# Patient Record
Sex: FEMALE | Race: WHITE
[De-identification: names, ages, dates, MRNs, and addresses within clinical notes are randomized per-mention and may not be internally consistent; named-entity substitution may affect disease eponyms.]

---

## 2021-01-27 ENCOUNTER — HOSPITAL ENCOUNTER
Dept: HOSPITAL 49 - FER | Age: 66
LOS: 1 days | Discharge: HOME | End: 2021-01-28
Admitting: INTERNAL MEDICINE
Payer: COMMERCIAL

## 2021-01-27 DIAGNOSIS — Z79.02: ICD-10-CM

## 2021-01-27 DIAGNOSIS — I25.10: ICD-10-CM

## 2021-01-27 DIAGNOSIS — E11.9: ICD-10-CM

## 2021-01-27 DIAGNOSIS — J44.1: ICD-10-CM

## 2021-01-27 DIAGNOSIS — R07.89: Primary | ICD-10-CM

## 2021-01-27 DIAGNOSIS — F17.210: ICD-10-CM

## 2021-01-27 DIAGNOSIS — I25.2: ICD-10-CM

## 2021-01-27 DIAGNOSIS — E89.0: ICD-10-CM

## 2021-01-27 DIAGNOSIS — I10: ICD-10-CM

## 2021-01-27 DIAGNOSIS — Z20.822: ICD-10-CM

## 2021-01-27 DIAGNOSIS — Z79.82: ICD-10-CM

## 2021-01-27 DIAGNOSIS — K21.9: ICD-10-CM

## 2021-01-27 DIAGNOSIS — R31.0: ICD-10-CM

## 2021-01-27 DIAGNOSIS — F41.8: ICD-10-CM

## 2021-01-27 DIAGNOSIS — E78.00: ICD-10-CM

## 2021-01-27 DIAGNOSIS — Z82.49: ICD-10-CM

## 2021-01-27 DIAGNOSIS — Z79.84: ICD-10-CM

## 2021-01-27 LAB
ALBUMIN SERPL-MCNC: 3.2 G/DL (ref 3.4–5)
ALKALINE PHOSHATASE: 112 U/L (ref 46–116)
ALT SERPL-CCNC: 36 U/L (ref 14–59)
AST: 24 U/L (ref 15–37)
BACTERIA: (no result)
BASOPHIL: 1.3 % (ref 0–2)
BILIRUBIN - TOTAL: 0.4 MG/DL (ref 0.2–1)
BILIRUBIN: NEGATIVE MG/DL
BLOOD: (no result) ERY/UL
BUN SERPL-MCNC: 10 MG/DL (ref 7–18)
BUN/CREAT RATIO (CALC): 16.9 RATIO
CHLORIDE: 106 MMOL/L (ref 98–107)
CLARITY UR: (no result)
CO2 (BICARBONATE): 26 MMOL/L (ref 21–32)
COLOR: (no result)
CREATININE: 0.59 MG/DL (ref 0.51–0.95)
EOSINOPHIL: 7 % (ref 0–7)
GLOBULIN (CALCULATION): 3.9 G/DL
GLUCOSE (U): NORMAL MG/DL
GLUCOSE SERPL-MCNC: 104 MG/DL (ref 74–106)
HCT: 39.5 % (ref 37–47)
HGB BLD-MCNC: 12.4 G/DL (ref 12.5–16)
LACTIC ACID: 1.4 MMOL/L (ref 0.4–1.9)
LDH1 SERPL-CCNC: 233 U/L (ref 81–234)
LEUKOCYTES: NEGATIVE LEU/UL
LYMPHOCYTE: 28.2 % (ref 15–48)
MCH RBC QN AUTO: 27.7 PG (ref 25–31)
MCHC RBC AUTO-ENTMCNC: 31.4 G/DL (ref 32–36)
MCV: 88.2 FL (ref 78–100)
MONOCYTE: 8.6 % (ref 0–12)
MPV: 10.8 FL (ref 6–9.5)
NEUTROPHIL: 54.6 % (ref 41–80)
NITRITE: NEGATIVE MG/DL
NRBC: 0
PLT: 220 K/UL (ref 150–400)
POTASSIUM: 3.8 MMOL/L (ref 3.5–5.1)
PRO-BNP: 392 PG/ML (ref ?–125)
PROTEIN: (no result) MG/DL
RBC MORPHOLOGY: NORMAL
RBC: 4.48 M/UL (ref 4.2–5.4)
RDW: 13.2 % (ref 11.5–14)
SPECIFIC GRAVITY: 1.01 (ref 1–1.03)
SQUAMOUS EPITHELIAL CELLS: (no result)
TOTAL PROTEIN: 7.1 G/DL (ref 6.4–8.2)
URINARY RBC: (no result)
UROBILINOGEN: 1 MG/DL (ref 0.2–1)
WBC: 7.9 K/UL (ref 4–10.5)

## 2021-01-27 PROCEDURE — G0378 HOSPITAL OBSERVATION PER HR: HCPCS

## 2021-01-27 PROCEDURE — U0002 COVID-19 LAB TEST NON-CDC: HCPCS

## 2021-01-28 LAB
BASOPHIL: 1.2 % (ref 0–2)
EOSINOPHIL: 6.6 % (ref 0–7)
HCT: 42.4 % (ref 37–47)
HGB BLD-MCNC: 13.1 G/DL (ref 12.5–16)
LYMPHOCYTE: 27.1 % (ref 15–48)
MCH RBC QN AUTO: 27.5 PG (ref 25–31)
MCHC RBC AUTO-ENTMCNC: 30.9 G/DL (ref 32–36)
MCV: 89.1 FL (ref 78–100)
MONOCYTE: 6.9 % (ref 0–12)
MPV: 11.3 FL (ref 6–9.5)
NEUTROPHIL: 58.1 % (ref 41–80)
NRBC: 0
PLT: 229 K/UL (ref 150–400)
RBC MORPHOLOGY: NORMAL
RBC: 4.76 M/UL (ref 4.2–5.4)
RDW: 13.4 % (ref 11.5–14)
WBC: 6.8 K/UL (ref 4–10.5)

## 2021-01-28 NOTE — NUR
1/28/21 Ms. Avila was  in Nov 2020. She is living with her daughter,
son-in-law and grandson. Ms. Avila has experienced a number of deaths; Mother
on [place], brother in Jan 2021, and an aunt last week. She is experiencing
grief. She denies suicidal ideations, intentions, or attempts. - MS. Avila was
provided with a crisis hotline, counseling services, and bereavement support
group through [facility]. ---She has a cane.

## 2021-07-08 ENCOUNTER — HOSPITAL ENCOUNTER (EMERGENCY)
Dept: HOSPITAL 49 - FER | Age: 66
LOS: 1 days | Discharge: HOME | End: 2021-07-09
Payer: MEDICARE

## 2021-07-08 DIAGNOSIS — E03.9: Primary | ICD-10-CM
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
## 2021-07-08 DIAGNOSIS — J44.9: ICD-10-CM

## 2021-07-08 DIAGNOSIS — S20.461A: ICD-10-CM

## 2021-07-08 DIAGNOSIS — F17.210: ICD-10-CM

## 2021-07-08 LAB
ALBUMIN SERPL-MCNC: 3.3 G/DL (ref 3.4–5)
ALKALINE PHOSHATASE: 92 U/L (ref 46–116)
ALT SERPL-CCNC: 31 U/L (ref 14–59)
AST: 22 U/L (ref 15–37)
BASOPHIL: 1.1 % (ref 0–2)
BILIRUBIN - TOTAL: 0.4 MG/DL (ref 0.2–1)
BUN SERPL-MCNC: 23 MG/DL (ref 7–18)
BUN/CREAT RATIO (CALC): 25.3 RATIO
CHLORIDE: 104 MMOL/L (ref 98–107)
CO2 (BICARBONATE): 30 MMOL/L (ref 21–32)
CREATININE: 0.91 MG/DL (ref 0.51–0.95)
EOSINOPHIL: 4.5 % (ref 0–7)
GLOBULIN (CALCULATION): 3.9 G/DL
GLUCOSE SERPL-MCNC: 152 MG/DL (ref 74–106)
HCT: 38.8 % (ref 37–47)
HGB BLD-MCNC: 12.4 G/DL (ref 12.5–16)
LYMPHOCYTE: 26.5 % (ref 15–48)
MCH RBC QN AUTO: 28.4 PG (ref 25–31)
MCHC RBC AUTO-ENTMCNC: 32 G/DL (ref 32–36)
MCV: 89 FL (ref 78–100)
MONOCYTE: 8.7 % (ref 0–12)
MPV: 10.7 FL (ref 6–9.5)
NEUTROPHIL: 58.8 % (ref 41–80)
NRBC: 0
PLT: 212 K/UL (ref 150–400)
POTASSIUM: 4 MMOL/L (ref 3.5–5.1)
RBC MORPHOLOGY: NORMAL
RBC: 4.36 M/UL (ref 4.2–5.4)
RDW: 14.1 % (ref 11.5–14)
TOTAL PROTEIN: 7.2 G/DL (ref 6.4–8.2)
WBC: 9.3 K/UL (ref 4–10.5)

## 2021-07-09 LAB
AMPHETAMINES: NEGATIVE
BACTERIA: (no result)
BARBITURATES: NEGATIVE
BILIRUBIN: NEGATIVE MG/DL
BLOOD: NEGATIVE ERY/UL
CLARITY UR: CLEAR
COLOR: YELLOW
ECSTASY (MDMA): NEGATIVE
GLUCOSE (U): NORMAL MG/DL
LEUKOCYTES: NEGATIVE LEU/UL
MARIJUANA (THC): NEGATIVE
METHADONE: NEGATIVE
NITRITE: NEGATIVE MG/DL
OPIATES: NEGATIVE
OXYCODONE: NEGATIVE
PROTEIN: (no result) MG/DL
SPECIFIC GRAVITY: 1.02 (ref 1–1.03)
SQUAMOUS EPITHELIAL CELLS: (no result)
URINARY RBC: (no result)
URINARY WBC: (no result)
UROBILINOGEN: 4 MG/DL (ref 0.2–1)

## 2022-05-09 ENCOUNTER — HOSPITAL ENCOUNTER (INPATIENT)
Dept: HOSPITAL 49 - FER | Age: 67
LOS: 7 days | Discharge: HOME | DRG: 871 | End: 2022-05-16
Attending: INTERNAL MEDICINE | Admitting: INTERNAL MEDICINE
Payer: COMMERCIAL

## 2022-05-09 VITALS — BODY MASS INDEX: 35.76 KG/M2 | HEIGHT: 64.02 IN | WEIGHT: 209.44 LBS

## 2022-05-09 DIAGNOSIS — E87.6: ICD-10-CM

## 2022-05-09 DIAGNOSIS — E78.5: ICD-10-CM

## 2022-05-09 DIAGNOSIS — E89.0: ICD-10-CM

## 2022-05-09 DIAGNOSIS — K21.9: ICD-10-CM

## 2022-05-09 DIAGNOSIS — Z79.02: ICD-10-CM

## 2022-05-09 DIAGNOSIS — M25.561: ICD-10-CM

## 2022-05-09 DIAGNOSIS — F32.A: ICD-10-CM

## 2022-05-09 DIAGNOSIS — M25.562: ICD-10-CM

## 2022-05-09 DIAGNOSIS — Z20.822: ICD-10-CM

## 2022-05-09 DIAGNOSIS — Z90.710: ICD-10-CM

## 2022-05-09 DIAGNOSIS — J44.0: ICD-10-CM

## 2022-05-09 DIAGNOSIS — D52.9: ICD-10-CM

## 2022-05-09 DIAGNOSIS — F17.210: ICD-10-CM

## 2022-05-09 DIAGNOSIS — Z98.890: ICD-10-CM

## 2022-05-09 DIAGNOSIS — N39.0: ICD-10-CM

## 2022-05-09 DIAGNOSIS — B37.7: ICD-10-CM

## 2022-05-09 DIAGNOSIS — R09.02: ICD-10-CM
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
## 2022-05-09 DIAGNOSIS — F41.9: ICD-10-CM

## 2022-05-09 DIAGNOSIS — Z28.310: ICD-10-CM

## 2022-05-09 DIAGNOSIS — J44.1: ICD-10-CM

## 2022-05-09 DIAGNOSIS — E83.42: ICD-10-CM

## 2022-05-09 DIAGNOSIS — A41.59: Primary | ICD-10-CM

## 2022-05-09 DIAGNOSIS — E11.9: ICD-10-CM

## 2022-05-09 DIAGNOSIS — Z86.14: ICD-10-CM

## 2022-05-09 DIAGNOSIS — N17.0: ICD-10-CM

## 2022-05-09 DIAGNOSIS — R65.21: ICD-10-CM

## 2022-05-09 DIAGNOSIS — M54.50: ICD-10-CM

## 2022-05-09 DIAGNOSIS — I11.0: ICD-10-CM

## 2022-05-09 DIAGNOSIS — G89.29: ICD-10-CM

## 2022-05-09 DIAGNOSIS — J98.11: ICD-10-CM

## 2022-05-09 DIAGNOSIS — Z79.899: ICD-10-CM

## 2022-05-09 DIAGNOSIS — Z79.84: ICD-10-CM

## 2022-05-09 DIAGNOSIS — A41.81: ICD-10-CM

## 2022-05-09 DIAGNOSIS — D50.9: ICD-10-CM

## 2022-05-09 DIAGNOSIS — G93.41: ICD-10-CM

## 2022-05-09 DIAGNOSIS — J18.9: ICD-10-CM

## 2022-05-09 DIAGNOSIS — Z95.5: ICD-10-CM

## 2022-05-09 DIAGNOSIS — I50.9: ICD-10-CM

## 2022-05-09 LAB
ALBUMIN SERPL-MCNC: 3.2 G/DL (ref 3.4–5)
ALKALINE PHOSHATASE: 143 U/L (ref 46–116)
ALT SERPL-CCNC: 32 U/L (ref 14–59)
AST: 29 U/L (ref 15–37)
BACTERIA: (no result)
BACTERIA: (no result)
BASOPHIL: 0.3 % (ref 0–2)
BASOPHIL: 0.4 % (ref 0–2)
BASOPHIL: 0.4 % (ref 0–2)
BILIRUBIN - TOTAL: 1.1 MG/DL (ref 0.2–1)
BILIRUBIN: NEGATIVE MG/DL
BILIRUBIN: NEGATIVE MG/DL
BLOOD: (no result) ERY/UL
BLOOD: (no result) ERY/UL
BUN SERPL-MCNC: 17 MG/DL (ref 7–18)
BUN SERPL-MCNC: 18 MG/DL (ref 7–18)
BUN SERPL-MCNC: 22 MG/DL (ref 7–18)
BUN SERPL-MCNC: 26 MG/DL (ref 7–18)
BUN/CREAT RATIO (CALC): 10.6 RATIO
BUN/CREAT RATIO (CALC): 11.7 RATIO
BUN/CREAT RATIO (CALC): 14.3 RATIO
BUN/CREAT RATIO (CALC): 15.6 RATIO
C-REACTIVE PROTEIN: 11.1 MG/DL (ref ?–0.9)
CHLORIDE: 102 MMOL/L (ref 98–107)
CHLORIDE: 102 MMOL/L (ref 98–107)
CHLORIDE: 106 MMOL/L (ref 98–107)
CHLORIDE: 106 MMOL/L (ref 98–107)
CLARITY UR: (no result)
CLARITY UR: CLEAR
CO2 (BICARBONATE): 18 MMOL/L (ref 21–32)
CO2 (BICARBONATE): 20 MMOL/L (ref 21–32)
CO2 (BICARBONATE): 20 MMOL/L (ref 21–32)
CO2 (BICARBONATE): 26 MMOL/L (ref 21–32)
COLOR: YELLOW
COLOR: YELLOW
CORONAVIRUS 2019 SARS-COV-2: NEGATIVE
CREATININE: 1.19 MG/DL (ref 0.51–0.95)
CREATININE: 1.67 MG/DL (ref 0.51–0.95)
CREATININE: 1.7 MG/DL (ref 0.51–0.95)
CREATININE: 1.88 MG/DL (ref 0.51–0.95)
EOSINOPHIL: 0 % (ref 0–7)
EOSINOPHIL: 0.2 % (ref 0–7)
EOSINOPHIL: 0.2 % (ref 0–7)
FOLIC ACID (SERUM): 4.9 NG/ML (ref 8.6–58.9)
FT4 (FREE T4): 1.3 NG/DL (ref 0.76–1.46)
GLOBULIN (CALCULATION): 3.8 G/DL
GLUCOSE (U): NORMAL MG/DL
GLUCOSE (U): NORMAL MG/DL
GLUCOSE SERPL-MCNC: 136 MG/DL (ref 74–106)
GLUCOSE SERPL-MCNC: 165 MG/DL (ref 74–106)
GLUCOSE SERPL-MCNC: 184 MG/DL (ref 74–106)
GLUCOSE SERPL-MCNC: 240 MG/DL (ref 74–106)
HCT: 36.8 % (ref 37–47)
HCT: 38.9 % (ref 37–47)
HCT: 40.7 % (ref 37–47)
HGB BLD-MCNC: 11.5 G/DL (ref 12.5–16)
HGB BLD-MCNC: 12.2 G/DL (ref 12.5–16)
HGB BLD-MCNC: 12.5 G/DL (ref 12.5–16)
INFLUENZA A NAA: NEGATIVE
INR PPP: 1.11 (ref 0.9–1.2)
IRON % SATURATION: 3.4 %SAT (ref 20–50)
IRON SERPL-MCNC: 11 UG/DL (ref 50–170)
LACTIC ACID: 2.4 MMOL/L (ref 0.4–1.9)
LEUKOCYTES: (no result) LEU/UL
LEUKOCYTES: (no result) LEU/UL
LIPASE: 63 U/L (ref 73–393)
LYMPHOCYTE: 3.2 % (ref 15–48)
LYMPHOCYTE: 4.4 % (ref 15–48)
LYMPHOCYTE: 5.9 % (ref 15–48)
MAGNESIUM SERPL-MCNC: 2.1 MG/DL (ref 1.8–2.4)
MAGNESIUM SERPL-MCNC: 2.4 MG/DL (ref 1.8–2.4)
MCH RBC QN AUTO: 27.6 PG (ref 25–31)
MCH RBC QN AUTO: 27.8 PG (ref 25–31)
MCH RBC QN AUTO: 27.9 PG (ref 25–31)
MCHC RBC AUTO-ENTMCNC: 30 G/DL (ref 32–36)
MCHC RBC AUTO-ENTMCNC: 31.3 G/DL (ref 32–36)
MCHC RBC AUTO-ENTMCNC: 32.1 G/DL (ref 32–36)
MCV: 86.4 FL (ref 78–100)
MCV: 88.2 FL (ref 78–100)
MCV: 92.9 FL (ref 78–100)
MONOCYTE: 1.3 % (ref 0–12)
MONOCYTE: 1.4 % (ref 0–12)
MONOCYTE: 4.4 % (ref 0–12)
MPV: 10.5 FL (ref 6–9.5)
MPV: 10.6 FL (ref 6–9.5)
MPV: 11.4 FL (ref 6–9.5)
NEUTROPHIL: 87.5 % (ref 41–80)
NEUTROPHIL: 91.7 % (ref 41–80)
NEUTROPHIL: 94.4 % (ref 41–80)
NITRITE: POSITIVE MG/DL
NITRITE: POSITIVE MG/DL
NRBC: 0
PHOSPHORUS: 3.1 MG/DL (ref 2.6–4.7)
PLT: 132 K/UL (ref 150–400)
PLT: 149 K/UL (ref 150–400)
PLT: 193 K/UL (ref 150–400)
POTASSIUM: 3.1 MMOL/L (ref 3.5–5.1)
POTASSIUM: 3.4 MMOL/L (ref 3.5–5.1)
POTASSIUM: 3.8 MMOL/L (ref 3.5–5.1)
POTASSIUM: 4.5 MMOL/L (ref 3.5–5.1)
PROTEIN: (no result) MG/DL
PROTEIN: NEGATIVE MG/DL
PROTHROMBIN TIME: 13.7 SECONDS (ref 11.8–13.4)
PTT: 25.5 SECONDS (ref 24.4–34.7)
RBC MORPHOLOGY: NORMAL
RBC: 4.17 M/UL (ref 4.2–5.4)
RBC: 4.38 M/UL (ref 4.2–5.4)
RBC: 4.5 M/UL (ref 4.2–5.4)
RDW: 12.8 % (ref 11.5–14)
RDW: 13 % (ref 11.5–14)
RDW: 13.2 % (ref 11.5–14)
SPECIFIC GRAVITY: 1.01 (ref 1–1.03)
SPECIFIC GRAVITY: 1.01 (ref 1–1.03)
SQUAMOUS EPITHELIAL CELLS: (no result)
SQUAMOUS EPITHELIAL CELLS: (no result)
TOTAL PROTEIN: 7 G/DL (ref 6.4–8.2)
URINARY RBC: (no result)
URINARY WBC: (no result)
URINE CREATININE: 93.54 MG/DL (ref 29–226)
UROBILINOGEN: 0.2 MG/DL (ref 0.2–1)
UROBILINOGEN: 0.2 MG/DL (ref 0.2–1)
VITAMIN B12: 1199 PG/ML (ref 193–986)
WBC: 25 K/UL (ref 4–10.5)
WBC: 5.1 K/UL (ref 4–10.5)
WBC: 9.7 K/UL (ref 4–10.5)

## 2022-05-09 PROCEDURE — 3E03329 INTRODUCTION OF OTHER ANTI-INFECTIVE INTO PERIPHERAL VEIN, PERCUTANEOUS APPROACH: ICD-10-PCS | Performed by: INTERNAL MEDICINE

## 2022-05-09 PROCEDURE — C1751 CATH, INF, PER/CENT/MIDLINE: HCPCS

## 2022-05-09 PROCEDURE — U0002 COVID-19 LAB TEST NON-CDC: HCPCS

## 2022-05-09 PROCEDURE — 0T9B70Z DRAINAGE OF BLADDER WITH DRAINAGE DEVICE, VIA NATURAL OR ARTIFICIAL OPENING: ICD-10-PCS | Performed by: INTERNAL MEDICINE

## 2022-05-09 PROCEDURE — 3E033XZ INTRODUCTION OF VASOPRESSOR INTO PERIPHERAL VEIN, PERCUTANEOUS APPROACH: ICD-10-PCS | Performed by: INTERNAL MEDICINE

## 2022-05-09 NOTE — NUR
1220 NOTIFIED DR CASEY OF PT BP  81/55, HE ORDERED A 2ND LR BOLUS, AND
TRANSFERED TO THE ICU RM 4, AFTER MOVING TO ICU BP DROPPED AGAIN AND TRE BP
WAS 58/38, DR REYES TOOK OVER FOR DR CASEY AND HE ORDERED LEVOPHED, STARTED
AT 4MCG WENT UP TO 8MCG.
 1810 LEVOPHED CURRENTLY AT 4MCG/15ML/HR /66 HR 77
PT MORE ALERT AT THIS TIME, AND ATE SOME CLEAR LIQUID DINNER

## 2022-05-09 NOTE — NUR
10 RINGS AND 4 BRACELETS GIVEN TO HER DAUGHTER BRONSON ZHU  AND
MYSELF REMOVED THE RINGS AND PLACED IN CUPS WITH TOPS

## 2022-05-10 LAB
BASOPHIL: 0.5 % (ref 0–2)
BUN SERPL-MCNC: 24 MG/DL (ref 7–18)
BUN/CREAT RATIO (CALC): 17.3 RATIO
CHLORIDE: 103 MMOL/L (ref 98–107)
CO2 (BICARBONATE): 21 MMOL/L (ref 21–32)
CREATININE: 1.39 MG/DL (ref 0.51–0.95)
EOSINOPHIL: 2 % (ref 0–7)
GLUCOSE SERPL-MCNC: 183 MG/DL (ref 74–106)
HCT: 36.4 % (ref 37–47)
HGB BLD-MCNC: 10.9 G/DL (ref 12.5–16)
LYMPHOCYTE: 4.9 % (ref 15–48)
MAGNESIUM SERPL-MCNC: 2 MG/DL (ref 1.8–2.4)
MCH RBC QN AUTO: 27.3 PG (ref 25–31)
MCHC RBC AUTO-ENTMCNC: 29.9 G/DL (ref 32–36)
MCV: 91 FL (ref 78–100)
MONOCYTE: 4.5 % (ref 0–12)
MPV: 11.5 FL (ref 6–9.5)
NEUTROPHIL: 81 % (ref 41–80)
NRBC: 0
PLT: 137 K/UL (ref 150–400)
POTASSIUM: 4.1 MMOL/L (ref 3.5–5.1)
RBC MORPHOLOGY: NORMAL
RBC: 4 M/UL (ref 4.2–5.4)
RDW: 13.2 % (ref 11.5–14)
WBC: 19.6 K/UL (ref 4–10.5)

## 2022-05-10 NOTE — NUR
5/10/22 Ms. Avila, her daughter, son-in-law and 15 y/o grandson share a home
together. She has a cane, 3in1, and inoperable concentrator orderd for
nocturnal use. Ms. Avila would like to use [facility] opposed to current [facility]
should continuous 02 be needed at discharge.

## 2022-05-11 LAB
BASOPHIL: 0.1 % (ref 0–2)
BUN SERPL-MCNC: 24 MG/DL (ref 7–18)
BUN/CREAT RATIO (CALC): 23.3 RATIO
CHLORIDE: 107 MMOL/L (ref 98–107)
CO2 (BICARBONATE): 19 MMOL/L (ref 21–32)
CREATININE: 1.03 MG/DL (ref 0.51–0.95)
EOSINOPHIL: 0 % (ref 0–7)
GLUCOSE SERPL-MCNC: 267 MG/DL (ref 74–106)
HCT: 31.5 % (ref 37–47)
HGB BLD-MCNC: 9.7 G/DL (ref 12.5–16)
LYMPHOCYTE: 4.8 % (ref 15–48)
MCH RBC QN AUTO: 27.4 PG (ref 25–31)
MCHC RBC AUTO-ENTMCNC: 30.8 G/DL (ref 32–36)
MCV: 89 FL (ref 78–100)
MONOCYTE: 5.7 % (ref 0–12)
MPV: 12.2 FL (ref 6–9.5)
NEUTROPHIL: 78.8 % (ref 41–80)
NRBC: 0
PLT: 125 K/UL (ref 150–400)
POTASSIUM: 4.6 MMOL/L (ref 3.5–5.1)
RBC MORPHOLOGY: NORMAL
RBC: 3.54 M/UL (ref 4.2–5.4)
RDW: 13.4 % (ref 11.5–14)
WBC: 14.2 K/UL (ref 4–10.5)

## 2022-05-11 PROCEDURE — 05HY33Z INSERTION OF INFUSION DEVICE INTO UPPER VEIN, PERCUTANEOUS APPROACH: ICD-10-PCS | Performed by: INTERNAL MEDICINE

## 2022-05-12 LAB
BASOPHIL: 0.1 % (ref 0–2)
BUN SERPL-MCNC: 20 MG/DL (ref 7–18)
BUN/CREAT RATIO (CALC): 16.7 RATIO
CHLORIDE: 108 MMOL/L (ref 98–107)
CO2 (BICARBONATE): 21 MMOL/L (ref 21–32)
CREATININE: 1.2 MG/DL (ref 0.51–0.95)
EOSINOPHIL: 0 % (ref 0–7)
GLUCOSE SERPL-MCNC: 290 MG/DL (ref 74–106)
HCT: 32 % (ref 37–47)
HGB BLD-MCNC: 9.7 G/DL (ref 12.5–16)
LYMPHOCYTE: 5.5 % (ref 15–48)
MCH RBC QN AUTO: 27.3 PG (ref 25–31)
MCHC RBC AUTO-ENTMCNC: 30.3 G/DL (ref 32–36)
MCV: 90.1 FL (ref 78–100)
MONOCYTE: 3 % (ref 0–12)
MPV: 12.1 FL (ref 6–9.5)
NEUTROPHIL: 83.2 % (ref 41–80)
NRBC: 0
PLT: 138 K/UL (ref 150–400)
POTASSIUM: 4.7 MMOL/L (ref 3.5–5.1)
RBC MORPHOLOGY: NORMAL
RBC: 3.55 M/UL (ref 4.2–5.4)
RDW: 13.6 % (ref 11.5–14)
WBC: 11.5 K/UL (ref 4–10.5)

## 2022-05-13 NOTE — NUR
5/13/22 It is anticipated that patient will require antibiotion infusion at
discharge. Ms. Avila prefers to have Outpatient infusion.

## 2022-05-16 LAB
BASOPHIL: 0.3 % (ref 0–2)
BUN SERPL-MCNC: 22 MG/DL (ref 7–18)
BUN/CREAT RATIO (CALC): 25.9 RATIO
CHLORIDE: 105 MMOL/L (ref 98–107)
CO2 (BICARBONATE): 25 MMOL/L (ref 21–32)
CREATININE: 0.85 MG/DL (ref 0.51–0.95)
EOSINOPHIL: 1 % (ref 0–7)
GLUCOSE SERPL-MCNC: 188 MG/DL (ref 74–106)
HCT: 31.9 % (ref 37–47)
HGB BLD-MCNC: 10 G/DL (ref 12.5–16)
LYMPHOCYTE: 11.9 % (ref 15–48)
MCH RBC QN AUTO: 27.3 PG (ref 25–31)
MCHC RBC AUTO-ENTMCNC: 31.3 G/DL (ref 32–36)
MCV: 87.2 FL (ref 78–100)
MONOCYTE: 8.8 % (ref 0–12)
MPV: 11 FL (ref 6–9.5)
NEUTROPHIL: 71.5 % (ref 41–80)
NRBC: 0.3
PLT: 143 K/UL (ref 150–400)
POTASSIUM: 4.4 MMOL/L (ref 3.5–5.1)
RBC MORPHOLOGY: NORMAL
RBC: 3.66 M/UL (ref 4.2–5.4)
RDW: 13.3 % (ref 11.5–14)
WBC: 8.9 K/UL (ref 4–10.5)

## 2022-05-16 PROCEDURE — B24BZZZ ULTRASONOGRAPHY OF HEART WITH AORTA: ICD-10-PCS | Performed by: INTERNAL MEDICINE

## 2022-05-17 NOTE — NUR
5/22/22 Isaias was informed that patient reports her concentrator not to be
working and her iability to reach them. Isaias will reach out to Ms. vAila.